# Patient Record
Sex: FEMALE | Race: OTHER | NOT HISPANIC OR LATINO
[De-identification: names, ages, dates, MRNs, and addresses within clinical notes are randomized per-mention and may not be internally consistent; named-entity substitution may affect disease eponyms.]

---

## 2023-07-25 ENCOUNTER — NON-APPOINTMENT (OUTPATIENT)
Age: 23
End: 2023-07-25

## 2023-07-28 PROBLEM — Z00.00 ENCOUNTER FOR PREVENTIVE HEALTH EXAMINATION: Status: ACTIVE | Noted: 2023-07-28

## 2023-07-31 ENCOUNTER — APPOINTMENT (OUTPATIENT)
Dept: OTOLARYNGOLOGY | Facility: CLINIC | Age: 23
End: 2023-07-31
Payer: MEDICAID

## 2023-07-31 VITALS — WEIGHT: 123 LBS | BODY MASS INDEX: 18.64 KG/M2 | HEIGHT: 68 IN

## 2023-07-31 DIAGNOSIS — Z78.9 OTHER SPECIFIED HEALTH STATUS: ICD-10-CM

## 2023-07-31 DIAGNOSIS — Z80.9 FAMILY HISTORY OF MALIGNANT NEOPLASM, UNSPECIFIED: ICD-10-CM

## 2023-07-31 DIAGNOSIS — Z87.09 PERSONAL HISTORY OF OTHER DISEASES OF THE RESPIRATORY SYSTEM: ICD-10-CM

## 2023-07-31 PROCEDURE — 99203 OFFICE O/P NEW LOW 30 MIN: CPT | Mod: 25

## 2023-07-31 PROCEDURE — 31231 NASAL ENDOSCOPY DX: CPT

## 2023-08-01 ENCOUNTER — TRANSCRIPTION ENCOUNTER (OUTPATIENT)
Age: 23
End: 2023-08-01

## 2023-08-01 PROBLEM — Z80.9 FAMILY HISTORY OF MALIGNANT NEOPLASM: Status: ACTIVE | Noted: 2023-07-31

## 2023-08-01 PROBLEM — Z78.9 CONSUMES ALCOHOL: Status: ACTIVE | Noted: 2023-07-31

## 2023-08-01 PROBLEM — Z87.09 HISTORY OF ASTHMA: Status: RESOLVED | Noted: 2023-07-31 | Resolved: 2023-08-01

## 2023-08-01 NOTE — PHYSICAL EXAM
[TextEntry] : PHYSICAL EXAM  General: The patient was alert, oriented and in no distress. Voice was clear. She sounded congested  Face: The patient had no facial asymmetry or mass. The skin was unremarkable.  Ears: Hearing normal to conversational voice External ears were normal without deformity. Ear canals were clear. No cerumen or inflammation Tympanic membranes were intact and normal. No perforation or effusion. mobile  Nose:  The external nose had no significant deformity.     On anterior rhinoscopy, the nasal mucosa was clear.   The anterior septum was grossly midline. There were no visualized polyps, purulence  or masses.   Oral cavity: Oral mucosa- normal. Oral and base of tongue- clear and without mass. Gingival and buccal mucosa- moist and without lesions. Palate- the palate moved well. There was no cleft palate. There appeared to be good salivary flow.   Oral cavity/oropharynx- no pus, erythema or mass  Neck:  The neck was symmetrical. The parotid and submandibular glands were normal without masses. The trachea was midline and there was no unusual crepitus. Thyroid was smooth and nontender and no masses were palpated. No masses  Lymphatics: Cervical adenopathy- none.

## 2023-08-01 NOTE — HISTORY OF PRESENT ILLNESS
[de-identified] : Jasmin Guillory is a 23 year old patient With a long history of nasal obstruction.  She has difficulty breathing through her nose.  The right side is worse.  She has nasal congestion, nasal obstruction, eustachian tube dysfunction, and mild sinus pressure.  She does not have headaches or visual changes. She saw an ENT in April.  She was placed on antibiotics and steroids.  She did not notice a change in her symptoms.  She was sent to a CT scan.  She had the images with her.  However, the report was in Serbian.  The patient was able to translate some of it for me..  The CT scan showed a deviated septum with complete opacification of the right sinuses and nasal cavity.  There was a large polyp in the right nasal cavity.  There appeared to be missing bone in the sphenoid.  The plan was to obtain an MRI and possibly a biopsy.

## 2023-08-01 NOTE — ASSESSMENT
[FreeTextEntry1] : She has a long history of nasal obstruction and congestion.  She has a large right nasal polyp obstruction of all the right sinuses.  The polyp is also obstructing the left choana.  She had a CT scan.  All of the sinuses on the right side were opacified as was the nasal cavity.  There was also bone missing from the sphenoid sinus  Plan -Findings and management options were discussed with the patient. -I am placing her back on a course of antibiotics and steroids. -She may use the Astelin and a nasal steroid spray as needed -I am sending her for an MRI with contrast for further evaluation.  We we will then discuss biopsy and possible surgery. -I will see her back after the MRI. -She should call and return earlier if any concerns or worsening symptoms

## 2023-08-01 NOTE — REASON FOR VISIT
[Initial Evaluation] : an initial evaluation for [Nasal Obstruction] : nasal obstruction [FreeTextEntry2] : nasal polyp

## 2023-08-08 ENCOUNTER — OUTPATIENT (OUTPATIENT)
Dept: OUTPATIENT SERVICES | Facility: HOSPITAL | Age: 23
LOS: 1 days | End: 2023-08-08

## 2023-08-08 ENCOUNTER — APPOINTMENT (OUTPATIENT)
Dept: MRI IMAGING | Facility: CLINIC | Age: 23
End: 2023-08-08
Payer: MEDICAID

## 2023-08-08 PROCEDURE — 70543 MRI ORBT/FAC/NCK W/O &W/DYE: CPT | Mod: 26

## 2023-08-10 ENCOUNTER — APPOINTMENT (OUTPATIENT)
Dept: OTOLARYNGOLOGY | Facility: CLINIC | Age: 23
End: 2023-08-10
Payer: MEDICAID

## 2023-08-10 VITALS — BODY MASS INDEX: 18.64 KG/M2 | WEIGHT: 123 LBS | HEIGHT: 68 IN

## 2023-08-10 PROCEDURE — 99213 OFFICE O/P EST LOW 20 MIN: CPT | Mod: 25

## 2023-08-10 PROCEDURE — 31237 NSL/SINS NDSC SURG BX POLYPC: CPT | Mod: RT

## 2023-08-10 RX ORDER — AMOXICILLIN AND CLAVULANATE POTASSIUM 875; 125 MG/1; MG/1
875-125 TABLET, COATED ORAL
Qty: 20 | Refills: 1 | Status: DISCONTINUED | COMMUNITY
Start: 2023-07-31 | End: 2023-08-10

## 2023-08-10 RX ORDER — PREDNISONE 10 MG/1
10 TABLET ORAL
Qty: 30 | Refills: 0 | Status: DISCONTINUED | COMMUNITY
Start: 2023-07-31 | End: 2023-08-10

## 2023-08-10 NOTE — ASSESSMENT
[FreeTextEntry1] : She has a long history of chronic rhinitis.  She has a large right intranasal mass which may be a polyp obstructing all of the right sinuses. A biopsy was taken today.  We reviewed her MRI- the offical reading is pending.   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   Plan -Findings and management options were discussed with the patient and her partner. We discussed the findings. They understand that the official report for the MRI is pending.  We discussed the possibility of benign vs malignant lesions. Surgery is recommended to ensure no malignancy and to prevent further progression of the disease. Once we have the offical MRI results and the biopsy results, we will speak with her further about this  -she may go ahead and take the antibiotics and steroids. She should take the medication with food and consider a probiotic -She should call and return earlier if any concerns or worsening symptoms

## 2023-08-10 NOTE — PHYSICAL EXAM
[TextEntry] : PHYSICAL EXAM  General: The patient was alert, oriented and in no distress. Voice was clear. She sounded congested  Face: The patient had no facial asymmetry or mass. The skin was unremarkable.  Ears: Hearing normal to conversational voice External ears were normal without deformity.  Nose:  The external nose had no significant deformity.      Neck:  The neck was symmetrical.

## 2023-08-10 NOTE — HISTORY OF PRESENT ILLNESS
[de-identified] : Jasmin Guillory is a 23 year old patient Here for follow-up for chronic sinusitis and right nasal obstruction.  She has a large intranasal mass suggestive of a polyp.  She did not take the antibiotics or steroids.  She had the MRI.  The MRI was reviewed with the patient. THe offical results are pending.  The patient was evaluated with my colleague, Dr. Medeiros, fellowship trained skull base/sinus surgeon.  Her symptoms remain unchanged.  She has nasal obstruction, congestion, and eustachian tube dysfunction.  She also has decreased smell and occasional pressure.  She has no visual changes or severe headaches.  Dr. Medeiros has also evaluated and reviewed her CT scan.  We reviewed the MRI results with the patient and her partner

## 2023-08-17 ENCOUNTER — NON-APPOINTMENT (OUTPATIENT)
Age: 23
End: 2023-08-17

## 2023-08-18 LAB — CORE LAB BIOPSY: NORMAL

## 2023-09-18 ENCOUNTER — NON-APPOINTMENT (OUTPATIENT)
Age: 23
End: 2023-09-18

## 2023-09-19 ENCOUNTER — TRANSCRIPTION ENCOUNTER (OUTPATIENT)
Age: 23
End: 2023-09-19

## 2023-09-19 NOTE — ASU PATIENT PROFILE, ADULT - FALL HARM RISK - UNIVERSAL INTERVENTIONS
Bed in lowest position, wheels locked, appropriate side rails in place/Call bell, personal items and telephone in reach/Instruct patient to call for assistance before getting out of bed or chair/Non-slip footwear when patient is out of bed/[place] to call system/Physically safe environment - no spills, clutter or unnecessary equipment/Purposeful Proactive Rounding/Room/bathroom lighting operational, light cord in reach

## 2023-09-20 ENCOUNTER — APPOINTMENT (OUTPATIENT)
Dept: OTOLARYNGOLOGY | Facility: HOSPITAL | Age: 23
End: 2023-09-20

## 2023-09-20 ENCOUNTER — TRANSCRIPTION ENCOUNTER (OUTPATIENT)
Age: 23
End: 2023-09-20

## 2023-09-20 ENCOUNTER — RESULT REVIEW (OUTPATIENT)
Age: 23
End: 2023-09-20

## 2023-09-20 ENCOUNTER — OUTPATIENT (OUTPATIENT)
Dept: OUTPATIENT SERVICES | Facility: HOSPITAL | Age: 23
LOS: 1 days | Discharge: HOME | End: 2023-09-20
Payer: MEDICAID

## 2023-09-20 VITALS
RESPIRATION RATE: 20 BRPM | HEART RATE: 75 BPM | DIASTOLIC BLOOD PRESSURE: 65 MMHG | SYSTOLIC BLOOD PRESSURE: 108 MMHG | OXYGEN SATURATION: 96 %

## 2023-09-20 VITALS
TEMPERATURE: 98 F | RESPIRATION RATE: 16 BRPM | HEART RATE: 68 BPM | WEIGHT: 113.98 LBS | HEIGHT: 68 IN | OXYGEN SATURATION: 98 % | DIASTOLIC BLOOD PRESSURE: 60 MMHG | SYSTOLIC BLOOD PRESSURE: 97 MMHG

## 2023-09-20 PROCEDURE — 87102 FUNGUS ISOLATION CULTURE: CPT

## 2023-09-20 PROCEDURE — 88305 TISSUE EXAM BY PATHOLOGIST: CPT

## 2023-09-20 PROCEDURE — 31259 NSL/SINS NDSC SPHN TISS RMVL: CPT

## 2023-09-20 PROCEDURE — 61782 SCAN PROC CRANIAL EXTRA: CPT

## 2023-09-20 PROCEDURE — 31276 NSL/SINS NDSC FRNT TISS RMVL: CPT

## 2023-09-20 PROCEDURE — 31253 NSL/SINS NDSC TOTAL: CPT | Mod: RT

## 2023-09-20 PROCEDURE — 88331 PATH CONSLTJ SURG 1 BLK 1SPC: CPT

## 2023-09-20 PROCEDURE — 30140 RESECT INFERIOR TURBINATE: CPT | Mod: 50

## 2023-09-20 PROCEDURE — 88311 DECALCIFY TISSUE: CPT | Mod: 26

## 2023-09-20 PROCEDURE — 30520 REPAIR OF NASAL SEPTUM: CPT

## 2023-09-20 PROCEDURE — 31267 ENDOSCOPY MAXILLARY SINUS: CPT | Mod: RT

## 2023-09-20 PROCEDURE — 31267 ENDOSCOPY MAXILLARY SINUS: CPT

## 2023-09-20 PROCEDURE — 31257 NSL/SINS NDSC TOT W/SPHENDT: CPT | Mod: RT

## 2023-09-20 PROCEDURE — 88311 DECALCIFY TISSUE: CPT

## 2023-09-20 PROCEDURE — 88331 PATH CONSLTJ SURG 1 BLK 1SPC: CPT | Mod: 26

## 2023-09-20 PROCEDURE — 88300 SURGICAL PATH GROSS: CPT

## 2023-09-20 PROCEDURE — 88300 SURGICAL PATH GROSS: CPT | Mod: 26,59

## 2023-09-20 PROCEDURE — 88305 TISSUE EXAM BY PATHOLOGIST: CPT | Mod: 26

## 2023-09-20 RX ORDER — BUDESONIDE AND FORMOTEROL FUMARATE DIHYDRATE 160; 4.5 UG/1; UG/1
2 AEROSOL RESPIRATORY (INHALATION)
Refills: 0 | DISCHARGE

## 2023-09-20 RX ORDER — ACETAMINOPHEN 500 MG
650 TABLET ORAL EVERY 6 HOURS
Refills: 0 | Status: DISCONTINUED | OUTPATIENT
Start: 2023-09-20 | End: 2023-09-20

## 2023-09-20 RX ORDER — SODIUM CHLORIDE 9 MG/ML
1000 INJECTION, SOLUTION INTRAVENOUS
Refills: 0 | Status: DISCONTINUED | OUTPATIENT
Start: 2023-09-20 | End: 2023-09-20

## 2023-09-20 RX ORDER — OXYCODONE HYDROCHLORIDE 5 MG/1
5 TABLET ORAL EVERY 6 HOURS
Refills: 0 | Status: DISCONTINUED | OUTPATIENT
Start: 2023-09-20 | End: 2023-09-20

## 2023-09-20 RX ORDER — HYDROMORPHONE HYDROCHLORIDE 2 MG/ML
0.25 INJECTION INTRAMUSCULAR; INTRAVENOUS; SUBCUTANEOUS
Refills: 0 | Status: DISCONTINUED | OUTPATIENT
Start: 2023-09-20 | End: 2023-09-20

## 2023-09-20 NOTE — ASU DISCHARGE PLAN (ADULT/PEDIATRIC) - CARE PROVIDER_API CALL
Janet Camarena Corrie  Otolaryngology  [address] [place] [address], [address]  [place] [address]  Phone: [phone]  Fax: [phone]  Follow Up Time:

## 2023-09-20 NOTE — ASU DISCHARGE PLAN (ADULT/PEDIATRIC) - NS MD DC FALL RISK RISK
For information on Fall & Injury Prevention, visit: [email or web address] OR  [email or web address] OR  https://www.cdc.gov/steadi/patient.html

## 2023-09-20 NOTE — PRE-ANESTHESIA EVALUATION ADULT - NSANTHOSAYNRD_GEN_A_CORE
No. JULIETTE screening performed.  STOP BANG Legend: 0-2 = LOW Risk; 3-4 = INTERMEDIATE Risk; 5-8 = HIGH Risk

## 2023-09-20 NOTE — ASU DISCHARGE PLAN (ADULT/PEDIATRIC) - ASU DC SPECIAL INSTRUCTIONSFT
Follow up with Dr. Camarena. Call her office to confirm or schedule an appointment.  Take steroids and antibiotics as prescribed.  For pain, you may use acetaminophen. Avoid NSAIDs like ibuprofen.   If no bleeding, start nasal saline sprays on the first day after surgery.   If you have bleeding episode, you may use Afrin. If it does not stop with Afrin and pressure, please notify Dr. Camarena or come to the emergency room.  Resume home meds.  You may eat a regular diet

## 2023-09-26 ENCOUNTER — APPOINTMENT (OUTPATIENT)
Dept: OTOLARYNGOLOGY | Facility: CLINIC | Age: 23
End: 2023-09-26
Payer: MEDICAID

## 2023-09-26 VITALS — WEIGHT: 123 LBS | BODY MASS INDEX: 18.64 KG/M2 | HEIGHT: 68 IN

## 2023-09-26 PROBLEM — J45.909 UNSPECIFIED ASTHMA, UNCOMPLICATED: Chronic | Status: ACTIVE | Noted: 2023-09-19

## 2023-09-26 PROCEDURE — 99024 POSTOP FOLLOW-UP VISIT: CPT

## 2023-09-26 RX ORDER — PREDNISONE 10 MG/1
10 TABLET ORAL
Qty: 30 | Refills: 0 | Status: COMPLETED | COMMUNITY
Start: 2023-09-18 | End: 2023-09-26

## 2023-09-26 RX ORDER — AMOXICILLIN AND CLAVULANATE POTASSIUM 875; 125 MG/1; MG/1
875-125 TABLET, COATED ORAL
Qty: 20 | Refills: 1 | Status: COMPLETED | COMMUNITY
Start: 2023-09-18 | End: 2023-09-26

## 2023-09-27 LAB — SURGICAL PATHOLOGY STUDY: SIGNIFICANT CHANGE UP

## 2023-10-05 ENCOUNTER — APPOINTMENT (OUTPATIENT)
Dept: OTOLARYNGOLOGY | Facility: CLINIC | Age: 23
End: 2023-10-05
Payer: MEDICAID

## 2023-10-05 PROBLEM — J33.9 NASAL POLYP, UNSPECIFIED: Chronic | Status: ACTIVE | Noted: 2023-09-19

## 2023-10-05 PROCEDURE — 31237 NSL/SINS NDSC SURG BX POLYPC: CPT | Mod: RT,58

## 2023-10-05 PROCEDURE — 99024 POSTOP FOLLOW-UP VISIT: CPT

## 2023-10-05 RX ORDER — MOMETASONE FUROATE 100 %
POWDER (GRAM) MISCELLANEOUS
Qty: 180 | Refills: 3 | Status: ACTIVE | COMMUNITY
Start: 2023-10-05 | End: 1900-01-01

## 2023-10-18 LAB
CULTURE RESULTS: SIGNIFICANT CHANGE UP
CULTURE RESULTS: SIGNIFICANT CHANGE UP
SPECIMEN SOURCE: SIGNIFICANT CHANGE UP
SPECIMEN SOURCE: SIGNIFICANT CHANGE UP

## 2023-10-26 ENCOUNTER — APPOINTMENT (OUTPATIENT)
Dept: OTOLARYNGOLOGY | Facility: CLINIC | Age: 23
End: 2023-10-26
Payer: MEDICAID

## 2023-10-26 VITALS — WEIGHT: 123 LBS | HEIGHT: 68 IN | BODY MASS INDEX: 18.64 KG/M2

## 2023-10-26 PROCEDURE — 99024 POSTOP FOLLOW-UP VISIT: CPT

## 2023-12-07 ENCOUNTER — APPOINTMENT (OUTPATIENT)
Dept: OTOLARYNGOLOGY | Facility: CLINIC | Age: 23
End: 2023-12-07
Payer: MEDICAID

## 2023-12-07 PROCEDURE — 31231 NASAL ENDOSCOPY DX: CPT | Mod: 78

## 2023-12-07 PROCEDURE — 99213 OFFICE O/P EST LOW 20 MIN: CPT | Mod: 25

## 2024-03-08 ENCOUNTER — APPOINTMENT (OUTPATIENT)
Dept: OTOLARYNGOLOGY | Facility: CLINIC | Age: 24
End: 2024-03-08
Payer: COMMERCIAL

## 2024-03-08 PROCEDURE — 99213 OFFICE O/P EST LOW 20 MIN: CPT | Mod: 25

## 2024-03-08 PROCEDURE — 31231 NASAL ENDOSCOPY DX: CPT

## 2024-03-08 NOTE — HISTORY OF PRESENT ILLNESS
[de-identified] : Jasmin Guillory is a 23 year old patient Here for follow-up for image guided endoscopic sinus surgery and nasal polypectomy.  She continues to do well.  She has a little bit nasal congestion and occasional bleeding from the nose but no sinus pain or pressure.  She is using nasal saline irrigations with topical steroids once a day.  ENT history 9/20/23- Image guided right endoscopic sinus surgery with nasal polypectomy and right sphenoid sinus debridement, septoplasty and left inferior turbinate recution with Dr. Medeiros and myselft. 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  patholgy showed amorpheus eosinophilic inflammatory debris, sinonasal mucosa with chronic inflammation. culture grew rare fusarium.

## 2024-03-08 NOTE — PHYSICAL EXAM
[TextEntry] : General: The patient was alert, oriented and in no distress. Voice was clear.  Face: The patient had no facial asymmetry or mass. The skin was unremarkable.  Ears: Hearing normal to conversational voice External ears were normal without deformity. Ear canals were clear. No cerumen or inflammation Tympanic membranes were intact and normal. No perforation or effusion. mobile  Nose: The external nose had no significant deformity. There was no facial tenderness. On anterior rhinoscopy, the nasal mucosa was normal. The anterior septum was grossly midline. There were no visualized polyps, purulence or masses.  Oral cavity: Oral mucosa- normal. Oral and base of tongue- clear and without mass. Gingival and buccal mucosa- moist and without lesions. Palate- the palate moved well. There was no cleft palate. There appeared to be good salivary flow. Oral cavity/oropharynx- no pus, erythema or mass  Neck: The neck was symmetrical. The parotid and submandibular glands were normal without masses. The trachea was midline and there was no unusual crepitus. Thyroid was smooth and nontender and no masses were palpated. No masses  Lymphatics: Cervical adenopathy- none.  Procedure     PROCEDURE: NASAL ENDOSCOPY Surgeon: Dr. Camarena Indication: chronic sinusitis, nasal polyps, status post surgery, assess for recurrent polyps.  Inadequate exam on anterior rhinoscopy Anesthetic: Topical lidocaine and Afrin Procedure: The patient was placed in a sitting position. Following application of the topical anesthetic and decongestant, exam was performed with a flexible telescope. The scope was passed along the right and left nasal floors to the nasopharynx. It was then passed into the region of the middle meatus, middle turbinate, and sphenoethmoid region. The following findings were noted:  Nasal mucosa: Mild edema Septum: straight. No perforation. Turbinates: inferior turbinates were normal. Right middle turbinate was partially resected  Right nasal cavity- Inferior meatus: normal Middle meatus: Postsurgical changes- The maxillary, ethmoid, and frontal sinuses were all open. There were no polyps, polypoid mucosa, or drainage. Right sphenoethmoidal recess: no polyps. The sphenoid ostium was large and open. There were no polyps, debris, or drainage. The entire sinus was visualized.  Left nasal cavity inferior meatus, middle meatus and sphenoethoidmal recess were normal. no pus, polyps or congestion

## 2024-03-08 NOTE — ASSESSMENT
[FreeTextEntry1] : She continues to do well.  She has minimal symptoms.  There is no evidence of recurrent polyps or sinus disease.  She has a little bit of bleeding intermittently from the nose.  This is likely due to irritation on the nasal septum  Plan -Findings and management options were discussed with the patient. -Avoid nasal manipulation -Moisturizing nasal gel twice daily as needed -She will continue the nasal saline irrigations with topical steroids -Allergy evaluation recommended- -I will see her back in 3 months if she is doing well -She should call and return earlier if any problems

## 2024-04-29 ENCOUNTER — RX RENEWAL (OUTPATIENT)
Age: 24
End: 2024-04-29

## 2024-05-28 ENCOUNTER — RX RENEWAL (OUTPATIENT)
Age: 24
End: 2024-05-28

## 2024-05-28 RX ORDER — BUDESONIDE 0.5 MG/2ML
0.5 INHALANT ORAL
Qty: 120 | Refills: 0 | Status: ACTIVE | COMMUNITY
Start: 2023-10-06 | End: 1900-01-01

## 2024-05-31 ENCOUNTER — APPOINTMENT (OUTPATIENT)
Dept: OTOLARYNGOLOGY | Facility: CLINIC | Age: 24
End: 2024-05-31
Payer: COMMERCIAL

## 2024-05-31 DIAGNOSIS — J33.9 NASAL POLYP, UNSPECIFIED: ICD-10-CM

## 2024-05-31 DIAGNOSIS — J32.4 CHRONIC PANSINUSITIS: ICD-10-CM

## 2024-05-31 PROCEDURE — 99213 OFFICE O/P EST LOW 20 MIN: CPT | Mod: 25

## 2024-05-31 PROCEDURE — 31231 NASAL ENDOSCOPY DX: CPT

## 2024-05-31 NOTE — ASSESSMENT
[FreeTextEntry1] : She is doing well.  She has no significant symptoms.  Her exam is stable.  There is no recurrent nasal polyps or sinus disease.  The sinuses are all open  Plan -Findings and management options were discussed with the patient. - Continue nasal saline irrigations with budesonide - Consider follow-up with the allergist - Follow-up in 3 to 4 months - Call and return earlier if any concerns

## 2024-05-31 NOTE — HISTORY OF PRESENT ILLNESS
[de-identified] : Jasmin Guillory is a 23 year old patient Is here for follow-up.  She continues to do well.  She has no sinus symptoms.  She uses nasal saline irrigations with budesonide.  ENT history 9/20/23- Image guided right endoscopic sinus surgery with nasal polypectomy and right sphenoid sinus debridement, septoplasty and left inferior turbinate recution with Dr. Medeiros and myselft. 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  patholgy showed amorpheus eosinophilic inflammatory debris, sinonasal mucosa with chronic inflammation. culture grew rare fusarium.

## 2024-09-06 ENCOUNTER — APPOINTMENT (OUTPATIENT)
Dept: OTOLARYNGOLOGY | Facility: CLINIC | Age: 24
End: 2024-09-06
Payer: COMMERCIAL

## 2024-09-06 VITALS — BODY MASS INDEX: 18.64 KG/M2 | WEIGHT: 123 LBS | HEIGHT: 68 IN

## 2024-09-06 DIAGNOSIS — J33.9 NASAL POLYP, UNSPECIFIED: ICD-10-CM

## 2024-09-06 DIAGNOSIS — J32.4 CHRONIC PANSINUSITIS: ICD-10-CM

## 2024-09-06 PROCEDURE — 31231 NASAL ENDOSCOPY DX: CPT

## 2024-09-06 PROCEDURE — 99213 OFFICE O/P EST LOW 20 MIN: CPT | Mod: 25

## 2024-09-06 NOTE — HISTORY OF PRESENT ILLNESS
[de-identified] : Jasmin Guillory is a 24 year old patient Is here for follow-up.  She continues to do well.  She uses nasal saline irrigations with budesonide daily.. she was around dogs recently and had mild allergy symptoms  ENT history 9/20/23- Image guided right endoscopic sinus surgery with nasal polypectomy and right sphenoid sinus debridement, septoplasty and left inferior turbinate recution with Dr. Medeiros and myselft. 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  patholgy showed amorpheus eosinophilic inflammatory debris, sinonasal mucosa with chronic inflammation. culture grew rare fusarium.

## 2024-09-06 NOTE — ASSESSMENT
[FreeTextEntry1] : She continues to do well.  There is no recurrent nasal polyps or sinus inflammation.  The sinuses were open   Plan -Findings and management options were discussed with the patient. - Continue nasal saline irrigations with budesonide - Allergy medications as needed -She may consider follow-up with the allergist - Follow-up in 3 to 6 months - Call and return earlier if any concerns.

## 2024-09-06 NOTE — PHYSICAL EXAM
[TextEntry] : General: The patient was alert, oriented and in no distress. Voice was clear.  Face: The patient had no facial asymmetry or mass. The skin was unremarkable.  Ears: Hearing normal to conversational voice External ears were normal without deformity. Ear canals were clear. No cerumen or inflammation Tympanic membranes were intact and normal. No perforation or effusion. mobile  Nose: The external nose had no significant deformity. There was no facial tenderness. On anterior rhinoscopy, the nasal mucosa was normal. The anterior septum was grossly midline. There were no visualized polyps, purulence or masses.  Oral cavity: Oral mucosa- normal. Oral and base of tongue- clear and without mass. Gingival and buccal mucosa- moist and without lesions. Palate- the palate moved well. There was no cleft palate. There appeared to be good salivary flow. Oral cavity/oropharynx- no pus, erythema or mass  Neck: The neck was symmetrical. The parotid and submandibular glands were normal without masses. The trachea was midline and there was no unusual crepitus. Thyroid was smooth and nontender and no masses were palpated. No masses  Lymphatics: Cervical adenopathy- none.  Procedure  PROCEDURE: NASAL ENDOSCOPY Surgeon: Dr. Camarena Indication: chronic sinusitis, nasal polyps, status post surgery, assess for recurrent polyps. Inadequate exam on anterior rhinoscopy Anesthetic: Topical lidocaine and Afrin Procedure: The patient was placed in a sitting position. Following application of the topical anesthetic and decongestant, exam was performed with a flexible telescope. The scope was passed along the right and left nasal floors to the nasopharynx. It was then passed into the region of the middle meatus, middle turbinate, and sphenoethmoid region. The following findings were noted:  Nasal mucosa: Mild edema Septum: straight. No perforation. Turbinates: inferior turbinates were normal. Right middle turbinate was partially resected  Right nasal cavity- Inferior meatus: normal Middle meatus: Postsurgical changes- The maxillary, ethmoid, and frontal sinuses were all open. There were no polyps, polypoid mucosa, or drainage. Right sphenoethmoidal recess: no polyps. The sphenoid ostium was large and open. There were no polyps or debris. no mycetoma. there was scant mucous. The entire sinus was visualized.  Left nasal cavity inferior meatus, middle meatus and sphenoethoidmal recess were normal. no pus, polyps or congestion

## 2025-01-10 ENCOUNTER — NON-APPOINTMENT (OUTPATIENT)
Age: 25
End: 2025-01-10

## 2025-01-10 ENCOUNTER — APPOINTMENT (OUTPATIENT)
Dept: OTOLARYNGOLOGY | Facility: CLINIC | Age: 25
End: 2025-01-10
Payer: COMMERCIAL

## 2025-01-10 DIAGNOSIS — J33.9 NASAL POLYP, UNSPECIFIED: ICD-10-CM

## 2025-01-10 DIAGNOSIS — J32.4 CHRONIC PANSINUSITIS: ICD-10-CM

## 2025-01-10 PROCEDURE — 31231 NASAL ENDOSCOPY DX: CPT

## 2025-01-10 PROCEDURE — 99214 OFFICE O/P EST MOD 30 MIN: CPT | Mod: 25

## 2025-01-10 RX ORDER — MOMETASONE 50 UG/1
50 SPRAY, METERED NASAL
Qty: 1 | Refills: 2 | Status: ACTIVE | COMMUNITY
Start: 2025-01-10 | End: 1900-01-01

## 2025-01-10 RX ORDER — FLUTICASONE FUROATE, UMECLIDINIUM BROMIDE AND VILANTEROL TRIFENATATE 200; 62.5; 25 UG/1; UG/1; UG/1
POWDER RESPIRATORY (INHALATION)
Refills: 0 | Status: ACTIVE | COMMUNITY

## 2025-04-01 ENCOUNTER — APPOINTMENT (OUTPATIENT)
Dept: OTOLARYNGOLOGY | Facility: CLINIC | Age: 25
End: 2025-04-01
Payer: COMMERCIAL

## 2025-04-01 DIAGNOSIS — J32.4 CHRONIC PANSINUSITIS: ICD-10-CM

## 2025-04-01 DIAGNOSIS — J33.9 NASAL POLYP, UNSPECIFIED: ICD-10-CM

## 2025-04-01 PROCEDURE — 31231 NASAL ENDOSCOPY DX: CPT

## 2025-04-01 PROCEDURE — 99213 OFFICE O/P EST LOW 20 MIN: CPT | Mod: 25

## 2025-07-03 ENCOUNTER — APPOINTMENT (OUTPATIENT)
Dept: ORTHOPEDIC SURGERY | Facility: CLINIC | Age: 25
End: 2025-07-03

## 2025-07-03 PROBLEM — M54.16 LUMBAR RADICULAR PAIN: Status: ACTIVE | Noted: 2025-07-03

## 2025-07-03 PROCEDURE — 99204 OFFICE O/P NEW MOD 45 MIN: CPT | Mod: 25

## 2025-07-03 PROCEDURE — 72100 X-RAY EXAM L-S SPINE 2/3 VWS: CPT

## 2025-07-03 RX ORDER — TIZANIDINE 2 MG/1
2 TABLET ORAL
Qty: 28 | Refills: 0 | Status: ACTIVE | COMMUNITY
Start: 2025-07-03 | End: 1900-01-01

## 2025-07-03 RX ORDER — METHYLPREDNISOLONE 4 MG/1
4 TABLET ORAL
Qty: 1 | Refills: 0 | Status: ACTIVE | COMMUNITY
Start: 2025-07-03 | End: 1900-01-01

## (undated) DEVICE — VENODYNE/SCD SLEEVE CALF MEDIUM

## (undated) DEVICE — BLADE MEDTRONIC ENT RAD 40 DEGREE ROTATABLE 4MM X 11CM

## (undated) DEVICE — TUBING SUCTION 20FT

## (undated) DEVICE — Device

## (undated) DEVICE — GLV 7 PROTEXIS (WHITE)

## (undated) DEVICE — BLADE MEDTRONIC ENT INFERIOR TURBINATE ROTATABLE STRAIGHT 2.9MM X 11CM

## (undated) DEVICE — SPECIMEN CONTAINER 100ML

## (undated) DEVICE — DRSG SPLINT INTRA NASAL .5MM STANDARD THICK

## (undated) DEVICE — WARMING BLANKET LOWER ADULT

## (undated) DEVICE — DRSG NASOPORE 4CM FIRM

## (undated) DEVICE — BLADE MEDTRONIC ENT QUADCUT ROTATABLE STRAIGHT 4MM X 13CM

## (undated) DEVICE — SYR BULB 2OZ (BLUE)

## (undated) DEVICE — SOL ANTI FOG

## (undated) DEVICE — TUBING SMOKE EVAC 3/8" X 10FT FOR NEPTUNE

## (undated) DEVICE — PACK UPPER BODY